# Patient Record
Sex: MALE | Race: OTHER | NOT HISPANIC OR LATINO | URBAN - METROPOLITAN AREA
[De-identification: names, ages, dates, MRNs, and addresses within clinical notes are randomized per-mention and may not be internally consistent; named-entity substitution may affect disease eponyms.]

---

## 2024-03-21 ENCOUNTER — OV NP (OUTPATIENT)
Dept: URBAN - METROPOLITAN AREA CLINIC 2 | Facility: CLINIC | Age: 39
End: 2024-03-21
Payer: COMMERCIAL

## 2024-03-21 ENCOUNTER — LAB (OUTPATIENT)
Dept: URBAN - METROPOLITAN AREA CLINIC 2 | Facility: CLINIC | Age: 39
End: 2024-03-21

## 2024-03-21 VITALS
HEIGHT: 75 IN | DIASTOLIC BLOOD PRESSURE: 89 MMHG | SYSTOLIC BLOOD PRESSURE: 119 MMHG | BODY MASS INDEX: 27.53 KG/M2 | TEMPERATURE: 98.6 F | WEIGHT: 221.4 LBS | HEART RATE: 86 BPM

## 2024-03-21 DIAGNOSIS — R13.19 ESOPHAGEAL DYSPHAGIA: ICD-10-CM

## 2024-03-21 DIAGNOSIS — R12 HEARTBURN: ICD-10-CM

## 2024-03-21 DIAGNOSIS — Z86.010 PERSONAL HISTORY OF COLONIC POLYPS: ICD-10-CM

## 2024-03-21 DIAGNOSIS — Z80.0 FAMILY HISTORY OF COLON CANCER: ICD-10-CM

## 2024-03-21 PROBLEM — 428283002: Status: ACTIVE | Noted: 2024-03-21

## 2024-03-21 PROCEDURE — 99204 OFFICE O/P NEW MOD 45 MIN: CPT | Performed by: NURSE PRACTITIONER

## 2024-03-21 RX ORDER — SERTRALINE HYDROCHLORIDE 50 MG/1
TABLET ORAL
Qty: 90 TABLET | Status: ACTIVE | COMMUNITY

## 2024-03-21 RX ORDER — MELOXICAM 15 MG/1
TAKE 1 TABLET BY MOUTH EVERY DAY TABLET ORAL
Qty: 30 EACH | Refills: 0 | Status: ACTIVE | COMMUNITY

## 2024-03-21 RX ORDER — HYDROXYZINE HYDROCHLORIDE 25 MG/1
TAKE 1 TABLET BY MOUTH EVERY DAY AS NEEDED TABLET, FILM COATED ORAL
Qty: 30 EACH | Refills: 1 | Status: ACTIVE | COMMUNITY

## 2024-03-21 NOTE — HPI-TODAY'S VISIT:
Very pleasant 39-year-old male seen today for complaints of heartburn and dysphagia and to plan colonoscopy.  Patient reports flares and heartburn and does feel a lump that makes it difficult to swallow.  This has been ongoing for several months.  Patient has a torn MCL.  He has prescription for meloxicam but does not take this often.  He does use omeprazole and this helps with heartburn.  He did have a colonoscopy over 10 years ago notable for 4 adenomatous colon polyps.  His maternal grandmother had colon cancer and his father has history of multiple colon polyps.  Patient denies change in bowel habits.  He reports being told he had colitis when he was 26.  He denies overt GI bleeding.

## 2024-04-16 ENCOUNTER — COL/EGD (OUTPATIENT)
Dept: URBAN - METROPOLITAN AREA SURGERY CENTER 19 | Facility: SURGERY CENTER | Age: 39
End: 2024-04-16
Payer: COMMERCIAL

## 2024-04-16 DIAGNOSIS — K63.89 OTHER SPECIFIED DISEASES OF INTESTINE: ICD-10-CM

## 2024-04-16 DIAGNOSIS — R09.A2 GLOBUS SENSATION: ICD-10-CM

## 2024-04-16 DIAGNOSIS — K25.9 GASTRIC ULCER WITHOUT HEMORRHAGE OR PERFORATION, UNSPECIFIED CHRONICITY: ICD-10-CM

## 2024-04-16 DIAGNOSIS — Z86.010 PERSONAL HISTORY OF COLONIC POLYPS: ICD-10-CM

## 2024-04-16 DIAGNOSIS — K31.89 OTHER DISEASES OF STOMACH AND DUODENUM: ICD-10-CM

## 2024-04-16 DIAGNOSIS — K22.89 OTHER SPECIFIED DISEASE OF ESOPHAGUS: ICD-10-CM

## 2024-04-16 DIAGNOSIS — R12 HEARTBURN: ICD-10-CM

## 2024-04-16 PROCEDURE — 43239 EGD BIOPSY SINGLE/MULTIPLE: CPT | Performed by: INTERNAL MEDICINE

## 2024-04-16 PROCEDURE — 45385 COLONOSCOPY W/LESION REMOVAL: CPT | Performed by: INTERNAL MEDICINE

## 2024-04-16 RX ORDER — MELOXICAM 15 MG/1
TAKE 1 TABLET BY MOUTH EVERY DAY TABLET ORAL
Qty: 30 EACH | Refills: 0 | Status: ACTIVE | COMMUNITY

## 2024-04-16 RX ORDER — SERTRALINE HYDROCHLORIDE 50 MG/1
TABLET ORAL
Qty: 90 TABLET | Status: ACTIVE | COMMUNITY

## 2024-04-16 RX ORDER — HYDROXYZINE HYDROCHLORIDE 25 MG/1
TAKE 1 TABLET BY MOUTH EVERY DAY AS NEEDED TABLET, FILM COATED ORAL
Qty: 30 EACH | Refills: 1 | Status: ACTIVE | COMMUNITY

## 2024-04-17 PROBLEM — 15902003: Status: ACTIVE | Noted: 2024-04-17

## 2024-05-01 ENCOUNTER — WEB ENCOUNTER (OUTPATIENT)
Dept: URBAN - METROPOLITAN AREA CLINIC 79 | Facility: CLINIC | Age: 39
End: 2024-05-01

## 2024-05-02 ENCOUNTER — OFFICE VISIT (OUTPATIENT)
Dept: URBAN - METROPOLITAN AREA CLINIC 2 | Facility: CLINIC | Age: 39
End: 2024-05-02

## 2024-05-13 ENCOUNTER — OFFICE VISIT (OUTPATIENT)
Dept: URBAN - METROPOLITAN AREA CLINIC 2 | Facility: CLINIC | Age: 39
End: 2024-05-13
Payer: COMMERCIAL

## 2024-05-13 ENCOUNTER — DASHBOARD ENCOUNTERS (OUTPATIENT)
Age: 39
End: 2024-05-13

## 2024-05-13 VITALS
HEIGHT: 75 IN | TEMPERATURE: 97.2 F | HEART RATE: 75 BPM | WEIGHT: 224.6 LBS | SYSTOLIC BLOOD PRESSURE: 105 MMHG | BODY MASS INDEX: 27.93 KG/M2 | DIASTOLIC BLOOD PRESSURE: 71 MMHG

## 2024-05-13 DIAGNOSIS — Z86.010 PERSONAL HISTORY OF COLONIC POLYPS: ICD-10-CM

## 2024-05-13 DIAGNOSIS — K25.4 GASTRIC ULCER WITH HEMORRHAGE, UNSPECIFIED CHRONICITY: ICD-10-CM

## 2024-05-13 DIAGNOSIS — R13.19 ESOPHAGEAL DYSPHAGIA: ICD-10-CM

## 2024-05-13 PROCEDURE — 99214 OFFICE O/P EST MOD 30 MIN: CPT | Performed by: NURSE PRACTITIONER

## 2024-05-13 RX ORDER — MELOXICAM 15 MG/1
TAKE 1 TABLET BY MOUTH EVERY DAY TABLET ORAL
Qty: 30 EACH | Refills: 0 | Status: ACTIVE | COMMUNITY

## 2024-05-13 RX ORDER — PANTOPRAZOLE SODIUM 40 MG/1
1 TABLET TABLET, DELAYED RELEASE ORAL TWICE A DAY
Qty: 180 TABLET | Refills: 3 | Status: ACTIVE | COMMUNITY
Start: 2024-04-17

## 2024-05-13 RX ORDER — SERTRALINE HYDROCHLORIDE 50 MG/1
TABLET ORAL
Qty: 90 TABLET | Status: ACTIVE | COMMUNITY

## 2024-05-13 RX ORDER — HYDROXYZINE HYDROCHLORIDE 25 MG/1
TAKE 1 TABLET BY MOUTH EVERY DAY AS NEEDED TABLET, FILM COATED ORAL
Qty: 30 EACH | Refills: 1 | Status: ACTIVE | COMMUNITY

## 2024-06-25 ENCOUNTER — OFFICE VISIT (OUTPATIENT)
Dept: URBAN - METROPOLITAN AREA SURGERY CENTER 19 | Facility: SURGERY CENTER | Age: 39
End: 2024-06-25